# Patient Record
Sex: MALE | Race: OTHER | HISPANIC OR LATINO
[De-identification: names, ages, dates, MRNs, and addresses within clinical notes are randomized per-mention and may not be internally consistent; named-entity substitution may affect disease eponyms.]

---

## 2020-01-14 ENCOUNTER — TRANSCRIPTION ENCOUNTER (OUTPATIENT)
Age: 55
End: 2020-01-14

## 2020-03-28 ENCOUNTER — EMERGENCY (EMERGENCY)
Facility: HOSPITAL | Age: 55
LOS: 0 days | Discharge: HOME | End: 2020-03-29
Attending: EMERGENCY MEDICINE
Payer: SELF-PAY

## 2020-03-28 VITALS
WEIGHT: 149.91 LBS | OXYGEN SATURATION: 100 % | RESPIRATION RATE: 17 BRPM | DIASTOLIC BLOOD PRESSURE: 81 MMHG | TEMPERATURE: 99 F | HEIGHT: 66 IN | SYSTOLIC BLOOD PRESSURE: 142 MMHG | HEART RATE: 85 BPM

## 2020-03-28 DIAGNOSIS — R06.02 SHORTNESS OF BREATH: ICD-10-CM

## 2020-03-28 DIAGNOSIS — R05 COUGH: ICD-10-CM

## 2020-03-28 DIAGNOSIS — R07.89 OTHER CHEST PAIN: ICD-10-CM

## 2020-03-28 DIAGNOSIS — B34.9 VIRAL INFECTION, UNSPECIFIED: ICD-10-CM

## 2020-03-28 PROCEDURE — 71045 X-RAY EXAM CHEST 1 VIEW: CPT | Mod: 26

## 2020-03-28 PROCEDURE — 93005 ELECTROCARDIOGRAM TRACING: CPT

## 2020-03-28 PROCEDURE — 93010 ELECTROCARDIOGRAM REPORT: CPT

## 2020-03-28 PROCEDURE — 99053 MED SERV 10PM-8AM 24 HR FAC: CPT

## 2020-03-28 PROCEDURE — 99283 EMERGENCY DEPT VISIT LOW MDM: CPT | Mod: 25

## 2020-03-28 PROCEDURE — 71045 X-RAY EXAM CHEST 1 VIEW: CPT

## 2020-03-28 PROCEDURE — 87635 SARS-COV-2 COVID-19 AMP PRB: CPT

## 2020-03-28 PROCEDURE — 99283 EMERGENCY DEPT VISIT LOW MDM: CPT

## 2020-03-28 NOTE — ED PROVIDER NOTE - CLINICAL SUMMARY MEDICAL DECISION MAKING FREE TEXT BOX
viral syndrome, likely COVID, swab sent.  will do chest xray, pt without tachypnea, respiratory distress, clear lungs, Pox 100%

## 2020-03-28 NOTE — ED PROVIDER NOTE - PATIENT PORTAL LINK FT
You can access the FollowMyHealth Patient Portal offered by [facility] by registering at the following website: [email or web address]. By joining [employer]’s FollowMyHealth portal, you will also be able to view your health information using other applications (apps) compatible with our system.

## 2020-03-28 NOTE — ED ADULT NURSE NOTE - NSIMPLEMENTINTERV_GEN_ALL_ED
Implemented All Fall with Harm Risk Interventions:  [place] to call system. Call bell, personal items and telephone within reach. Instruct patient to call for assistance. Room bathroom lighting operational. Non-slip footwear when patient is off stretcher. Physically safe environment: no spills, clutter or unnecessary equipment. Stretcher in lowest position, wheels locked, appropriate side rails in place. Provide visual cue, wrist band, yellow gown, etc. Monitor gait and stability. Monitor for mental status changes and reorient to person, place, and time. Review medications for side effects contributing to fall risk. Reinforce activity limits and safety measures with patient and family. Provide visual clues: red socks.

## 2020-03-28 NOTE — ED ADULT NURSE NOTE - CHPI ED NUR SYMPTOMS NEG
no vomiting/no chills/no decreased eating/drinking/no weakness/no dizziness/no tingling/no fever/no nausea

## 2020-03-28 NOTE — ED PROVIDER NOTE - NSFOLLOWUPINSTRUCTIONS_ED_ALL_ED_FT
LMTCB -     Dr. René anthony to send in minivelle 0.1 mg 24 hour patch if patient is agreeable.  
Minivelle 0.1 mg patch sent into patient's pharmacy. Patient will let us know if she because symptomatic on the new patch.   
Your COVID-19 test is pending.  You will be notified of the results.  Self-Quarantine.    Have no contact with others that you might spread your infection to.   Stay home.    Take Tylenol 1 gram every 6 hours for fever.    Stay well hydrated  Return to ED for any concerns--any shortness of breath or difficulty breathing.

## 2020-03-28 NOTE — ED PROVIDER NOTE - OBJECTIVE STATEMENT
53yo male c/o cough, shortness of breath and chest pain with coughing.  Pt had fever last week with URI symptoms, no longer with fever.  Not taking any medications.  Wife also sick in the ED, had positive contact with COVID patient.  No n/v/d.  No abdominal pain.

## 2020-03-28 NOTE — ED PROVIDER NOTE - PROGRESS NOTE DETAILS
Patient likely with COVID 19 infection.  Chest Xray wnl.  Pox >95% without respiratory distress.  Will d/c home with return instructions for any worsening symptoms

## 2020-03-29 LAB — SARS-COV-2 RNA SPEC QL NAA+PROBE: SIGNIFICANT CHANGE UP

## 2020-03-29 NOTE — ED POST DISCHARGE NOTE - DETAILS
Left message for call back. signed Daja Naik PA-C Unable to reach patient. Will send certified letter via  callbacks. - Nahun Gamez PA-C

## 2020-04-07 RX ORDER — AZITHROMYCIN 500 MG/1
1 TABLET, FILM COATED ORAL
Qty: 6 | Refills: 0
Start: 2020-04-07 | End: 2020-04-12

## 2020-06-12 PROBLEM — Z78.9 OTHER SPECIFIED HEALTH STATUS: Chronic | Status: ACTIVE | Noted: 2020-03-28

## 2020-06-23 ENCOUNTER — APPOINTMENT (OUTPATIENT)
Dept: UROLOGY | Facility: CLINIC | Age: 55
End: 2020-06-23
Payer: MEDICAID

## 2020-06-23 VITALS
OXYGEN SATURATION: 98 % | HEIGHT: 73 IN | DIASTOLIC BLOOD PRESSURE: 77 MMHG | BODY MASS INDEX: 18.95 KG/M2 | WEIGHT: 143 LBS | SYSTOLIC BLOOD PRESSURE: 127 MMHG | HEART RATE: 69 BPM

## 2020-06-23 DIAGNOSIS — Z80.0 FAMILY HISTORY OF MALIGNANT NEOPLASM OF DIGESTIVE ORGANS: ICD-10-CM

## 2020-06-23 PROCEDURE — 99204 OFFICE O/P NEW MOD 45 MIN: CPT | Mod: 25

## 2020-06-23 PROCEDURE — 81003 URINALYSIS AUTO W/O SCOPE: CPT | Mod: QW

## 2020-06-24 PROBLEM — Z80.0 FAMILY HISTORY OF LIVER CANCER: Status: ACTIVE | Noted: 2020-06-24

## 2020-06-24 LAB
APPEARANCE: CLEAR
BACTERIA: NEGATIVE
BILIRUB UR QL STRIP: NORMAL
BILIRUBIN URINE: NEGATIVE
BLOOD URINE: NEGATIVE
COLOR: NORMAL
GLUCOSE QUALITATIVE U: NEGATIVE
GLUCOSE UR-MCNC: NORMAL
HCG UR QL: 0.2 EU/DL
HGB UR QL STRIP.AUTO: NORMAL
HYALINE CASTS: 0 /LPF
KETONES UR-MCNC: NORMAL
KETONES URINE: NEGATIVE
LEUKOCYTE ESTERASE UR QL STRIP: NORMAL
LEUKOCYTE ESTERASE URINE: NEGATIVE
MICROSCOPIC-UA: NORMAL
NITRITE UR QL STRIP: NORMAL
NITRITE URINE: NEGATIVE
PH UR STRIP: 7.5
PH URINE: 7.5
PROT UR STRIP-MCNC: NORMAL
PROTEIN URINE: NEGATIVE
RED BLOOD CELLS URINE: 0 /HPF
SP GR UR STRIP: 1.02
SPECIFIC GRAVITY URINE: 1.02
SQUAMOUS EPITHELIAL CELLS: 0 /HPF
UROBILINOGEN URINE: NORMAL
WHITE BLOOD CELLS URINE: 0 /HPF

## 2020-06-24 NOTE — REVIEW OF SYSTEMS
[Recent Weight Gain (___ Lbs)] : recent [unfilled] ~Ulb weight gain [Recent Weight Loss (___ Lbs)] : recent [unfilled] ~Ulb weight loss [Slow urine stream] : slow urine stream [Heartburn] : heartburn [Wake up at night to urinate  How many times?  ___] : wakes up to urinate [unfilled] times during the night [Dizziness] : dizziness [see HPI] : see HPI [Negative] : Neurological

## 2020-06-24 NOTE — PHYSICAL EXAM
[General Appearance - Well Developed] : well developed [General Appearance - Well Nourished] : well nourished [Normal Appearance] : normal appearance [Well Groomed] : well groomed [General Appearance - In No Acute Distress] : no acute distress [Edema] : no peripheral edema [Respiration, Rhythm And Depth] : normal respiratory rhythm and effort [Exaggerated Use Of Accessory Muscles For Inspiration] : no accessory muscle use [Abdomen Soft] : soft [Abdomen Tenderness] : non-tender [Costovertebral Angle Tenderness] : no ~M costovertebral angle tenderness [Urethral Meatus] : meatus normal [Urinary Bladder Findings] : the bladder was normal on palpation [Testes Mass (___cm)] : there were no testicular masses [Scrotum] : the scrotum was normal [No Prostate Nodules] : no prostate nodules [FreeTextEntry1] : Palpably benign small prostate gland [Normal Station and Gait] : the gait and station were normal for the patient's age [] : no rash [No Focal Deficits] : no focal deficits [Oriented To Time, Place, And Person] : oriented to person, place, and time [Affect] : the affect was normal [Mood] : the mood was normal [Not Anxious] : not anxious [No Palpable Adenopathy] : no palpable adenopathy

## 2020-06-24 NOTE — HISTORY OF PRESENT ILLNESS
[FreeTextEntry1] : This patient presents with complaints of erectile dysfunction noted gradually over the past year or so.  He denies difficulties urinating at this time.  He has not tried any remedies for erectile dysfunction to this time.

## 2020-06-24 NOTE — END OF VISIT
[FreeTextEntry3] : Labs are sent including a serum testosterone and he will have a duplex scan of the penis with plans to follow

## 2020-06-25 LAB — URINE CYTOLOGY: NORMAL

## 2021-12-13 ENCOUNTER — TRANSCRIPTION ENCOUNTER (OUTPATIENT)
Age: 56
End: 2021-12-13

## 2022-02-23 ENCOUNTER — APPOINTMENT (OUTPATIENT)
Dept: UROLOGY | Facility: CLINIC | Age: 57
End: 2022-02-23
Payer: MEDICAID

## 2022-02-23 VITALS
BODY MASS INDEX: 24.43 KG/M2 | SYSTOLIC BLOOD PRESSURE: 134 MMHG | OXYGEN SATURATION: 96 % | HEIGHT: 66 IN | HEART RATE: 92 BPM | DIASTOLIC BLOOD PRESSURE: 78 MMHG | WEIGHT: 152 LBS

## 2022-02-23 DIAGNOSIS — N52.9 MALE ERECTILE DYSFUNCTION, UNSPECIFIED: ICD-10-CM

## 2022-02-23 DIAGNOSIS — N40.0 BENIGN PROSTATIC HYPERPLASIA WITHOUT LOWER URINARY TRACT SYMPMS: ICD-10-CM

## 2022-02-23 PROCEDURE — 99214 OFFICE O/P EST MOD 30 MIN: CPT

## 2022-02-23 RX ORDER — SILDENAFIL 20 MG/1
20 TABLET ORAL
Qty: 90 | Refills: 11 | Status: ACTIVE | COMMUNITY
Start: 2022-02-23 | End: 1900-01-01

## 2022-02-23 NOTE — END OF VISIT
[FreeTextEntry3] : Labs were sent and he will have a duplex scan of the penis in 1 month.  I called in sildenafil for him with full instructions and precautions

## 2022-02-23 NOTE — HISTORY OF PRESENT ILLNESS
[FreeTextEntry1] : This patient has complaints of recent onset erectile dysfunction.  He has not had a PSA in some time either.

## 2022-02-23 NOTE — REVIEW OF SYSTEMS
[Recent Weight Gain (___ Lbs)] : recent [unfilled] ~Ulb weight gain [Recent Weight Loss (___ Lbs)] : recent [unfilled] ~Ulb weight loss [Heartburn] : heartburn [see HPI] : see HPI [Wake up at night to urinate  How many times?  ___] : wakes up to urinate [unfilled] times during the night [Slow urine stream] : slow urine stream [Dizziness] : dizziness [Negative] : Heme/Lymph

## 2022-02-23 NOTE — PHYSICAL EXAM
[General Appearance - Well Developed] : well developed [General Appearance - Well Nourished] : well nourished [Normal Appearance] : normal appearance [Well Groomed] : well groomed [General Appearance - In No Acute Distress] : no acute distress [Abdomen Soft] : soft [Abdomen Tenderness] : non-tender [Costovertebral Angle Tenderness] : no ~M costovertebral angle tenderness [Urethral Meatus] : meatus normal [Urinary Bladder Findings] : the bladder was normal on palpation [Scrotum] : the scrotum was normal [Testes Mass (___cm)] : there were no testicular masses [No Prostate Nodules] : no prostate nodules [FreeTextEntry1] : Palpably benign small prostate gland [Edema] : no peripheral edema [] : no respiratory distress [Respiration, Rhythm And Depth] : normal respiratory rhythm and effort [Exaggerated Use Of Accessory Muscles For Inspiration] : no accessory muscle use [Oriented To Time, Place, And Person] : oriented to person, place, and time [Affect] : the affect was normal [Mood] : the mood was normal [Not Anxious] : not anxious [Normal Station and Gait] : the gait and station were normal for the patient's age [No Focal Deficits] : no focal deficits [No Palpable Adenopathy] : no palpable adenopathy

## 2022-02-24 LAB
ALBUMIN SERPL ELPH-MCNC: 4.6 G/DL
ALP BLD-CCNC: 110 U/L
ALT SERPL-CCNC: 22 U/L
ANION GAP SERPL CALC-SCNC: 15 MMOL/L
APPEARANCE: CLEAR
AST SERPL-CCNC: 17 U/L
BACTERIA: NEGATIVE
BILIRUB SERPL-MCNC: 0.5 MG/DL
BILIRUBIN URINE: NEGATIVE
BLOOD URINE: NEGATIVE
BUN SERPL-MCNC: 23 MG/DL
CALCIUM SERPL-MCNC: 9 MG/DL
CHLORIDE SERPL-SCNC: 100 MMOL/L
CO2 SERPL-SCNC: 23 MMOL/L
COLOR: NORMAL
CREAT SERPL-MCNC: 1.36 MG/DL
GLUCOSE QUALITATIVE U: NEGATIVE
GLUCOSE SERPL-MCNC: 85 MG/DL
HYALINE CASTS: 0 /LPF
KETONES URINE: NEGATIVE
LEUKOCYTE ESTERASE URINE: NEGATIVE
MICROSCOPIC-UA: NORMAL
NITRITE URINE: NEGATIVE
PH URINE: 6.5
POTASSIUM SERPL-SCNC: 5.1 MMOL/L
PROT SERPL-MCNC: 7.3 G/DL
PROTEIN URINE: NEGATIVE
PSA SERPL-MCNC: 0.8 NG/ML
RED BLOOD CELLS URINE: 1 /HPF
SODIUM SERPL-SCNC: 138 MMOL/L
SPECIFIC GRAVITY URINE: 1.02
SQUAMOUS EPITHELIAL CELLS: 0 /HPF
TESTOST SERPL-MCNC: 402 NG/DL
UROBILINOGEN URINE: NORMAL
WHITE BLOOD CELLS URINE: 0 /HPF

## 2022-02-25 LAB — URINE CYTOLOGY: NORMAL

## 2022-02-27 ENCOUNTER — EMERGENCY (EMERGENCY)
Facility: HOSPITAL | Age: 57
LOS: 0 days | Discharge: HOME | End: 2022-02-28
Attending: EMERGENCY MEDICINE
Payer: MEDICAID

## 2022-02-27 VITALS
RESPIRATION RATE: 18 BRPM | DIASTOLIC BLOOD PRESSURE: 73 MMHG | SYSTOLIC BLOOD PRESSURE: 112 MMHG | HEART RATE: 92 BPM | TEMPERATURE: 98 F | OXYGEN SATURATION: 98 % | WEIGHT: 164.02 LBS | HEIGHT: 66 IN

## 2022-02-27 DIAGNOSIS — L50.9 URTICARIA, UNSPECIFIED: ICD-10-CM

## 2022-02-27 DIAGNOSIS — R21 RASH AND OTHER NONSPECIFIC SKIN ERUPTION: ICD-10-CM

## 2022-02-27 PROCEDURE — 99284 EMERGENCY DEPT VISIT MOD MDM: CPT

## 2022-02-27 PROCEDURE — 99283 EMERGENCY DEPT VISIT LOW MDM: CPT

## 2022-02-27 RX ORDER — FAMOTIDINE 10 MG/ML
1 INJECTION INTRAVENOUS
Qty: 4 | Refills: 0
Start: 2022-02-27 | End: 2022-03-02

## 2022-02-27 RX ORDER — DIPHENHYDRAMINE HCL 50 MG
1 CAPSULE ORAL
Qty: 20 | Refills: 0
Start: 2022-02-27

## 2022-02-27 RX ORDER — FAMOTIDINE 10 MG/ML
40 INJECTION INTRAVENOUS ONCE
Refills: 0 | Status: COMPLETED | OUTPATIENT
Start: 2022-02-27 | End: 2022-02-27

## 2022-02-27 RX ORDER — DIPHENHYDRAMINE HCL 50 MG
50 CAPSULE ORAL ONCE
Refills: 0 | Status: COMPLETED | OUTPATIENT
Start: 2022-02-27 | End: 2022-02-27

## 2022-02-27 RX ADMIN — Medication 50 MILLIGRAM(S): at 23:50

## 2022-02-27 RX ADMIN — FAMOTIDINE 20 MILLIGRAM(S): 10 INJECTION INTRAVENOUS at 23:48

## 2022-02-27 RX ADMIN — Medication 50 MILLIGRAM(S): at 23:49

## 2022-02-27 NOTE — ED STATDOCS - NSFOLLOWUPINSTRUCTIONS_ED_ALL_ED_FT
Urticaria    WHAT YOU NEED TO KNOW:    Urticaria is also called hives. Hives can change size and shape, and appear anywhere on your skin. They can be mild or severe and last from a few minutes to a few days. Hives may be a sign of a severe allergic reaction called anaphylaxis that needs immediate treatment. Urticaria that lasts longer than 6 weeks may be a chronic condition that needs long-term treatment.    Hives         DISCHARGE INSTRUCTIONS:    Call your local emergency number (911 in the ) for signs or symptoms of anaphylaxis, such as trouble breathing, swelling in your mouth or throat, or wheezing. You may also have itching, a rash, or feel like you are going to faint.    Return to the emergency department if:   •Your heart is beating faster than it normally does.      •You have cramping or severe pain in your abdomen.      Call your doctor if:   •You have a fever.      •Your skin still itches 24 hours after you take your medicine.      •You still have hives after 7 days.      •Your joints are painful and swollen.      •You have questions or concerns about your condition or care.      Medicines: You may need any of the following:  •Epinephrine is used to treat severe allergic reactions such as anaphylaxis.      •Antihistamines decrease mild symptoms such as itching or a rash.      •Steroids decrease redness, pain, and swelling.      •Take your medicine as directed. Contact your healthcare provider if you think your medicine is not helping or if you have side effects. Tell him of her if you are allergic to any medicine. Keep a list of the medicines, vitamins, and herbs you take. Include the amounts, and when and why you take them. Bring the list or the pill bottles to follow-up visits. Carry your medicine list with you in case of an emergency.      Steps to take for signs or symptoms of anaphylaxis:   •Immediately give 1 shot of epinephrine only into the outer thigh muscle.  How to Give An Epinephrine Shot Adult           •Leave the shot in place as directed. Your healthcare provider may recommend you leave it in place for up to 10 seconds before you remove it. This helps make sure all of the epinephrine is delivered.      •Call 911 and go to the emergency department, even if the shot improved symptoms. Do not drive yourself. Bring the used epinephrine shot with you.      Safety precautions to take if you are at risk for anaphylaxis:   •Keep 2 shots of epinephrine with you at all times. You may need a second shot, because epinephrine only works for about 20 minutes and symptoms may return. Your healthcare provider can show you and family members how to give the shot. Check the expiration date every month and replace it before it expires.      •Create an action plan. Your healthcare provider can help you create a written plan that explains the allergy and an emergency plan to treat a reaction. The plan explains when to give a second epinephrine shot if symptoms return or do not improve after the first. Give copies of the action plan and emergency instructions to family members, work and school staff, and  providers. Show them how to give a shot of epinephrine.      •Be careful when you exercise. If you have had exercise-induced anaphylaxis, do not exercise right after you eat. Stop exercising right away if you start to develop any signs or symptoms of anaphylaxis. You may first feel tired, warm, or have itchy skin. Hives, swelling, and severe breathing problems may develop if you continue to exercise.      •Carry medical alert identification. Wear medical alert jewelry or carry a card that explains the allergy. Ask your healthcare provider where to get these items.   Medical Alert Jewelry           •Keep a record of triggers and symptoms. Record everything you eat, drink, or apply to your skin for 3 weeks. Include stressful events and what you were doing right before your hives started. Bring the record with you to follow-up visits with your healthcare provider.      Manage urticaria:   •Cool your skin. This may help decrease itching. Apply a cool pack to your hives. Dip a hand towel in cool water, wring it out, and place it on your hives. You may also soak your skin in a cool oatmeal bath.      •Do not rub your hives. This can irritate your skin and cause more hives.      •Wear loose clothing. Tight clothes may irritate your skin and cause more hives.      •Manage stress. Stress may trigger hives, or make them worse. Learn new ways to relax, such as deep breathing.       Follow up with your healthcare provider as directed: Write down your questions so you remember to ask them during your visits.

## 2022-02-27 NOTE — ED STATDOCS - CLINICAL SUMMARY MEDICAL DECISION MAKING FREE TEXT BOX
Pt presents with hives, given prednisone, benadryl, pepcid, Pt presents with hives, given prednisone, benadryl, pepcid, I instructed him to discontinue the medication at this time since it maybe the cause (it was started a week ago) he is given f/u, return precautions and was dc in stable condition.

## 2022-02-27 NOTE — ED STATDOCS - PHYSICAL EXAMINATION
Constitutional: NAD AAOx3  Eyes: PERRLA EOMI  Head: Normocephalic atraumatic  Mouth: MMM, oropharynx is clear, normal tongue, no swelling   Cardiac: regular rate   Resp: Lungs CTAB  GI: Abd s/nt/nd  Neuro: CN2-12 intact  Extremities: Intact distal pulses b/l, no calf tenderness, normal ROM b/l UE and LE   Skin: + urticara present on back, abdomen, legs and arms

## 2022-02-27 NOTE — ED STATDOCS - NS ED ROS FT
Constitutional: No fever or chills  Eyes: No visual changes  HEENT: No throat pain  CV: No chest pain  Resp: No SOB no cough  GI: No abd pain, nausea or vomiting  : No dysuria  MSK: No musculoskeletal pain  Skin: +hives  Neuro: No headache

## 2022-02-27 NOTE — ED STATDOCS - OBJECTIVE STATEMENT
Pt is a 55 y/o M presenting with itchy rash that is present on his back, legs, abdomen and arms, stated he woke up with it today, denied any throat/tongue swelling, denied any CP/SOB/abd pain. Stated he had started a new medication last week, Gemfibrizol, otherwise denied any new soaps, detergents, new foods.

## 2022-02-27 NOTE — ED STATDOCS - PATIENT PORTAL LINK FT
You can access the FollowMyHealth Patient Portal offered by [facility] by registering at the following website: [email or web address]. By joining [employer]’s FollowMyHealth portal, you will also be able to view your health information using other applications (apps) compatible with our system.

## 2022-02-28 VITALS
HEART RATE: 76 BPM | SYSTOLIC BLOOD PRESSURE: 101 MMHG | TEMPERATURE: 98 F | OXYGEN SATURATION: 100 % | DIASTOLIC BLOOD PRESSURE: 62 MMHG | RESPIRATION RATE: 18 BRPM

## 2022-02-28 NOTE — ED ADULT NURSE NOTE - OBJECTIVE STATEMENT
Pt. reports to Ed for hives. Pt. reports hives on torso, and thighs, and urticaria.  Pt. denies SOB, tounge swelling, chest pain.

## 2022-02-28 NOTE — ED ADULT NURSE NOTE - NSIMPLEMENTINTERV_GEN_ALL_ED
Implemented All Universal Safety Interventions:  [place] to call system. Call bell, personal items and telephone within reach. Instruct patient to call for assistance. Room bathroom lighting operational. Non-slip footwear when patient is off stretcher. Physically safe environment: no spills, clutter or unnecessary equipment. Stretcher in lowest position, wheels locked, appropriate side rails in place.

## 2022-03-08 ENCOUNTER — APPOINTMENT (OUTPATIENT)
Dept: ALLERGY | Facility: CLINIC | Age: 57
End: 2022-03-08

## 2022-03-29 ENCOUNTER — APPOINTMENT (OUTPATIENT)
Dept: UROLOGY | Facility: CLINIC | Age: 57
End: 2022-03-29

## 2022-04-06 ENCOUNTER — EMERGENCY (EMERGENCY)
Facility: HOSPITAL | Age: 57
LOS: 0 days | Discharge: HOME | End: 2022-04-06
Attending: EMERGENCY MEDICINE
Payer: MEDICAID

## 2022-04-06 VITALS
TEMPERATURE: 98 F | DIASTOLIC BLOOD PRESSURE: 82 MMHG | WEIGHT: 154.1 LBS | HEIGHT: 66 IN | OXYGEN SATURATION: 99 % | RESPIRATION RATE: 18 BRPM | SYSTOLIC BLOOD PRESSURE: 125 MMHG | HEART RATE: 91 BPM

## 2022-04-06 VITALS
HEART RATE: 62 BPM | RESPIRATION RATE: 18 BRPM | OXYGEN SATURATION: 100 % | DIASTOLIC BLOOD PRESSURE: 81 MMHG | TEMPERATURE: 98 F | SYSTOLIC BLOOD PRESSURE: 126 MMHG

## 2022-04-06 DIAGNOSIS — Z80.0 FAMILY HISTORY OF MALIGNANT NEOPLASM OF DIGESTIVE ORGANS: ICD-10-CM

## 2022-04-06 DIAGNOSIS — F41.9 ANXIETY DISORDER, UNSPECIFIED: ICD-10-CM

## 2022-04-06 DIAGNOSIS — M54.2 CERVICALGIA: ICD-10-CM

## 2022-04-06 DIAGNOSIS — R22.1 LOCALIZED SWELLING, MASS AND LUMP, NECK: ICD-10-CM

## 2022-04-06 LAB
ALBUMIN SERPL ELPH-MCNC: 3.6 G/DL — SIGNIFICANT CHANGE UP (ref 3.3–5)
ALP SERPL-CCNC: 90 U/L — SIGNIFICANT CHANGE UP (ref 40–120)
ALT FLD-CCNC: 38 U/L — SIGNIFICANT CHANGE UP (ref 12–78)
ANION GAP SERPL CALC-SCNC: 4 MMOL/L — LOW (ref 5–17)
AST SERPL-CCNC: 27 U/L — SIGNIFICANT CHANGE UP (ref 15–37)
BASOPHILS # BLD AUTO: 0.07 K/UL — SIGNIFICANT CHANGE UP (ref 0–0.2)
BASOPHILS NFR BLD AUTO: 1.1 % — SIGNIFICANT CHANGE UP (ref 0–2)
BILIRUB SERPL-MCNC: 0.4 MG/DL — SIGNIFICANT CHANGE UP (ref 0.2–1.2)
BUN SERPL-MCNC: 28 MG/DL — HIGH (ref 7–23)
CALCIUM SERPL-MCNC: 8.5 MG/DL — SIGNIFICANT CHANGE UP (ref 8.5–10.1)
CHLORIDE SERPL-SCNC: 106 MMOL/L — SIGNIFICANT CHANGE UP (ref 96–108)
CO2 SERPL-SCNC: 28 MMOL/L — SIGNIFICANT CHANGE UP (ref 22–31)
CREAT SERPL-MCNC: 1.01 MG/DL — SIGNIFICANT CHANGE UP (ref 0.5–1.3)
EGFR: 88 ML/MIN/1.73M2 — SIGNIFICANT CHANGE UP
EOSINOPHIL # BLD AUTO: 0.14 K/UL — SIGNIFICANT CHANGE UP (ref 0–0.5)
EOSINOPHIL NFR BLD AUTO: 2.3 % — SIGNIFICANT CHANGE UP (ref 0–6)
GLUCOSE SERPL-MCNC: 108 MG/DL — HIGH (ref 70–99)
HCT VFR BLD CALC: 41.3 % — SIGNIFICANT CHANGE UP (ref 39–50)
HGB BLD-MCNC: 14.3 G/DL — SIGNIFICANT CHANGE UP (ref 13–17)
IMM GRANULOCYTES NFR BLD AUTO: 0.2 % — SIGNIFICANT CHANGE UP (ref 0–1.5)
LACTATE SERPL-SCNC: 0.1 MMOL/L — LOW (ref 0.7–2)
LYMPHOCYTES # BLD AUTO: 2.28 K/UL — SIGNIFICANT CHANGE UP (ref 1–3.3)
LYMPHOCYTES # BLD AUTO: 37.1 % — SIGNIFICANT CHANGE UP (ref 13–44)
MCHC RBC-ENTMCNC: 32.4 PG — SIGNIFICANT CHANGE UP (ref 27–34)
MCHC RBC-ENTMCNC: 34.6 GM/DL — SIGNIFICANT CHANGE UP (ref 32–36)
MCV RBC AUTO: 93.7 FL — SIGNIFICANT CHANGE UP (ref 80–100)
MONOCYTES # BLD AUTO: 0.53 K/UL — SIGNIFICANT CHANGE UP (ref 0–0.9)
MONOCYTES NFR BLD AUTO: 8.6 % — SIGNIFICANT CHANGE UP (ref 2–14)
NEUTROPHILS # BLD AUTO: 3.11 K/UL — SIGNIFICANT CHANGE UP (ref 1.8–7.4)
NEUTROPHILS NFR BLD AUTO: 50.7 % — SIGNIFICANT CHANGE UP (ref 43–77)
PLATELET # BLD AUTO: 219 K/UL — SIGNIFICANT CHANGE UP (ref 150–400)
POTASSIUM SERPL-MCNC: 3.7 MMOL/L — SIGNIFICANT CHANGE UP (ref 3.5–5.3)
POTASSIUM SERPL-SCNC: 3.7 MMOL/L — SIGNIFICANT CHANGE UP (ref 3.5–5.3)
PROT SERPL-MCNC: 7.3 GM/DL — SIGNIFICANT CHANGE UP (ref 6–8.3)
RBC # BLD: 4.41 M/UL — SIGNIFICANT CHANGE UP (ref 4.2–5.8)
RBC # FLD: 12.7 % — SIGNIFICANT CHANGE UP (ref 10.3–14.5)
SODIUM SERPL-SCNC: 138 MMOL/L — SIGNIFICANT CHANGE UP (ref 135–145)
WBC # BLD: 6.14 K/UL — SIGNIFICANT CHANGE UP (ref 3.8–10.5)
WBC # FLD AUTO: 6.14 K/UL — SIGNIFICANT CHANGE UP (ref 3.8–10.5)

## 2022-04-06 PROCEDURE — G1004: CPT

## 2022-04-06 PROCEDURE — 70491 CT SOFT TISSUE NECK W/DYE: CPT | Mod: MG

## 2022-04-06 PROCEDURE — 36415 COLL VENOUS BLD VENIPUNCTURE: CPT

## 2022-04-06 PROCEDURE — 70491 CT SOFT TISSUE NECK W/DYE: CPT | Mod: 26,MG

## 2022-04-06 PROCEDURE — 99284 EMERGENCY DEPT VISIT MOD MDM: CPT | Mod: 25

## 2022-04-06 PROCEDURE — 70450 CT HEAD/BRAIN W/O DYE: CPT | Mod: MF

## 2022-04-06 PROCEDURE — 70450 CT HEAD/BRAIN W/O DYE: CPT | Mod: 26,MF

## 2022-04-06 PROCEDURE — 99285 EMERGENCY DEPT VISIT HI MDM: CPT

## 2022-04-06 PROCEDURE — 83605 ASSAY OF LACTIC ACID: CPT

## 2022-04-06 PROCEDURE — 85025 COMPLETE CBC W/AUTO DIFF WBC: CPT

## 2022-04-06 PROCEDURE — 80053 COMPREHEN METABOLIC PANEL: CPT

## 2022-04-06 RX ORDER — SODIUM CHLORIDE 9 MG/ML
1000 INJECTION INTRAMUSCULAR; INTRAVENOUS; SUBCUTANEOUS ONCE
Refills: 0 | Status: COMPLETED | OUTPATIENT
Start: 2022-04-06 | End: 2022-04-06

## 2022-04-06 RX ADMIN — SODIUM CHLORIDE 1000 MILLILITER(S): 9 INJECTION INTRAMUSCULAR; INTRAVENOUS; SUBCUTANEOUS at 20:10

## 2022-04-06 NOTE — ED STATDOCS - CARE PLAN
Principal Discharge DX:	Neck pain  Goal:	No nuchal rigidity, only here because he felt there was a bulge on the left neck, lymphadenopathy ?, CT shows no acute pathology, family hx of gastric adenocarcinoma, to follow up with PMD, GI, no complaints dc   1

## 2022-04-06 NOTE — ED STATDOCS - ENMT, MLM
Nasal mucosa clear.  Mouth with normal mucosa  +Palpable irregular on the L side of the neck on sternocleidomastoid muscle, Throat has no vesicles, no oropharyngeal exudates and uvula is midline.

## 2022-04-06 NOTE — ED STATDOCS - PLAN OF CARE
No nuchal rigidity, only here because he felt there was a bulge on the left neck, lymphadenopathy ?, CT shows no acute pathology, family hx of gastric adenocarcinoma, to follow up with PMD, GI, no complaints dc

## 2022-04-06 NOTE — ED STATDOCS - PATIENT PORTAL LINK FT
You can access the FollowMyHealth Patient Portal offered by [facility] by registering at the following website: [email or web address]. By joining [employer]’s FollowMyHealth portal, you will also be able to view your health information using other applications (apps) compatible with our system.

## 2022-04-06 NOTE — ED STATDOCS - PROGRESS NOTE DETAILS
Naye SOLOMON: Nontoxic appearing. AAOx 4. Ambulatory. no visual or focal neurological complaints. Discussed the results of the CT with patient and provided him with hard copies. Patient is understandably anxious about possibility of CA and tumors as cause of his painful gland? vs. sensation of swelling. Instructed him to see GI, to consider endo, and MR of the neck and potential further other outpatient testing out of the scope of the ED. Patient AAOx4, fully understanding, will follow up with GI, strict return precautions, happy with care provided in the ED prior to dc.

## 2022-04-06 NOTE — ED STATDOCS - NSFOLLOWUPINSTRUCTIONS_ED_ALL_ED_FT
Please note that we have discussed the result of your CT scan and labs with you. Pleas note that you need to see a gastroenterologist and your primary care doctor as soon as possible, se you might need further imaging such as endoscopy, colonoscopy or MRI. IF you symptoms get worse return to us. Otherwise please note that a CT is the most appropriate emergent imaging, does not cover certain pathologies such as tumors, etc. Please note that we have discussed the result of your CT scan and labs with you. Pleas note that you need to see a gastroenterologist and your primary care doctor as soon as possible, se you might need further imaging such as endoscopy, colonoscopy or MRI. IF you symptoms get worse return to us. Otherwise please note that a CT is the most appropriate emergent imaging, does not cover certain pathologies such as tumors, etc. Please return to us if you have any fever, chills, abdominal pain, blood in stool or urine or if any other health concerns. Please return to us if any other health concerns. Please note that I have discussed all the results with you and provided you with outpatient follow up with a gastroenterologist and you are to see your primary care physician as well as soon as possible.    ________________

## 2022-04-06 NOTE — ED STATDOCS - CARE PROVIDER_API CALL
Gabby Flores  GASTROENTEROLOGY  [address]  [place] [address]  Phone: [phone]  Fax: [phone]  Follow Up Time:

## 2022-04-06 NOTE — ED ADULT NURSE NOTE - OBJECTIVE STATEMENT
pt c/o left side neck pain/nodule started this am. "I found a lump that was painful and sticking out on my left side of neck".

## 2022-04-06 NOTE — ED STATDOCS - NS_ ATTENDINGSCRIBEDETAILS _ED_A_ED_FT
I Taj Yancey MD saw and examined the patient. Scribe documented for me and under my supervision. I have modified the scribe's documentation where necessary to reflect my history, physical exam and other relevant documentations pertinent to the care of the patient.

## 2022-04-06 NOTE — ED STATDOCS - MUSCULOSKELETAL, MLM
range of motion is not limited and there is no muscle tenderness. range of motion is not limited and there is no muscle tenderness. 5/5 strength on flexion and extension of all limbs. No nuchal rigidity. no saddle anesthesia.

## 2022-04-06 NOTE — ED STATDOCS - OBJECTIVE STATEMENT
55 y/o male presents to the ED with L sided neck swelling. Pt reports his family was concerned and came to the ED for eval. No chest pain, no abdominal pain. Pt endorses family hx of stomach cancer. Pt with no other complaints at this time. 57 y/o male presents to the ED with L sided neck sensation of swelling. Pt reports his family was concerned and came to the ED for eval. No chest pain, no abdominal pain. No neck stiffness. No melena or hematochezia. No dysuria hematuria or frequency. No abdominal pain. No cp, sob or palpitation. No phlegm expectoration, no exertional dyspnea, no orthopnea, no known hx of tumors or cancers, no recent surgery on neck or chest, has family hx of gastric CA, and so was discussing the sensation of painful lymph or sensation of swelling and came to the ED to be evaluated. Patient states in the planning to have MR outpatient but as of now no scheduled imaging.  Pt endorses family hx of stomach cancer. Pt with no other complaints at this time.

## 2022-04-13 ENCOUNTER — APPOINTMENT (OUTPATIENT)
Dept: GASTROENTEROLOGY | Facility: CLINIC | Age: 57
End: 2022-04-13
Payer: MEDICAID

## 2022-04-13 VITALS
HEART RATE: 87 BPM | SYSTOLIC BLOOD PRESSURE: 129 MMHG | BODY MASS INDEX: 24.75 KG/M2 | WEIGHT: 154 LBS | HEIGHT: 66 IN | DIASTOLIC BLOOD PRESSURE: 80 MMHG

## 2022-04-13 DIAGNOSIS — K21.9 GASTRO-ESOPHAGEAL REFLUX DISEASE W/OUT ESOPHAGITIS: ICD-10-CM

## 2022-04-13 PROCEDURE — 99203 OFFICE O/P NEW LOW 30 MIN: CPT

## 2022-04-13 NOTE — HISTORY OF PRESENT ILLNESS
[FreeTextEntry1] : Recently the patient went to the emergency room when he saw his muscle in his neck thinking that it was a mass. He was discharged for followup to a gastroenterologist. He has long-standing difficulty with indigestion and burping. No constipation no diarrhea no rectal

## 2022-04-13 NOTE — PHYSICAL EXAM
[General Appearance - Alert] : alert [General Appearance - In No Acute Distress] : in no acute distress [PERRL With Normal Accommodation] : pupils were equal in size, round, and reactive to light [Sclera] : the sclera and conjunctiva were normal [Extraocular Movements] : extraocular movements were intact [Outer Ear] : the ears and nose were normal in appearance [Oropharynx] : the oropharynx was normal [Neck Appearance] : the appearance of the neck was normal [Neck Cervical Mass (___cm)] : no neck mass was observed [Jugular Venous Distention Increased] : there was no jugular-venous distention [Thyroid Diffuse Enlargement] : the thyroid was not enlarged [Thyroid Nodule] : there were no palpable thyroid nodules [Auscultation Breath Sounds / Voice Sounds] : lungs were clear to auscultation bilaterally [Heart Rate And Rhythm] : heart rate was normal and rhythm regular [Heart Sounds] : normal S1 and S2 [Heart Sounds Gallop] : no gallops [Murmurs] : no murmurs [Heart Sounds Pericardial Friction Rub] : no pericardial rub [Bowel Sounds] : normal bowel sounds [Abdomen Tenderness] : non-tender [Abdomen Soft] : soft [] : no hepato-splenomegaly [Abdomen Mass (___ Cm)] : no abdominal mass palpated

## 2022-07-18 ENCOUNTER — APPOINTMENT (OUTPATIENT)
Dept: GASTROENTEROLOGY | Facility: AMBULATORY MEDICAL SERVICES | Age: 57
End: 2022-07-18

## 2022-11-15 ENCOUNTER — APPOINTMENT (OUTPATIENT)
Dept: GASTROENTEROLOGY | Facility: AMBULATORY MEDICAL SERVICES | Age: 57
End: 2022-11-15

## 2023-01-16 NOTE — ED STATDOCS - NEURO NEGATIVE STATEMENT, MLM
[address] [facility] @ [facility] VISIT NOTE     0845: Patient arrives for Galectin Clinical Trial without acute problems. Please see [facility] for complete assessment and education provided. Vital signs stable throughout and prior to discharge, Pt. Tolerated treatment well and discharged without incident. Patient is aware of next [place] appointment on 01/30/2023. Appointment card given to patient.     Medications  Medications Administered       0.9% sodium chloride infusion       Admin Date  01/16/2023 Action  New Bag Dose  25 mL/hr Rate  25 mL/hr Route  IntraVENous Administered By  Jammie Moss RN              INV [id number] galactoarabino-rhamnogalacturonate/placebo in 0.9% sodium chloride 100 mL (+/- 10% base fluid volume) investigational infusion       Admin Date  01/16/2023 Action  New Bag Dose   Rate  100 mL/hr Route  IntraVENous Administered By  Jammie Moss RN                      VITAL SIGNS   Patient Vitals for the past 12 hrs:   Temp Pulse Resp BP SpO2   01/16/23 1058 -- 74 16 (!) 124/91 --   01/16/23 0848 97.5 °F (36.4 °C) 79 18 (!) 147/80 98 % no loss of consciousness, no gait abnormality, no headache, no sensory deficits, and no weakness.

## 2023-05-08 NOTE — ED PROVIDER NOTE - CPE EDP EYES NORM
normal... Notification Instructions: Patient will be notified of biopsy results. However, patient instructed to call the office if not contacted within 2 weeks.

## 2024-02-13 ENCOUNTER — APPOINTMENT (OUTPATIENT)
Dept: HUMAN REPRODUCTION | Facility: CLINIC | Age: 59
End: 2024-02-13

## 2024-03-18 ENCOUNTER — APPOINTMENT (OUTPATIENT)
Dept: HUMAN REPRODUCTION | Facility: CLINIC | Age: 59
End: 2024-03-18
Payer: SELF-PAY

## 2024-03-18 PROCEDURE — 89322 SEMEN ANAL STRICT CRITERIA: CPT

## 2024-04-15 ENCOUNTER — APPOINTMENT (OUTPATIENT)
Dept: UROLOGY | Facility: CLINIC | Age: 59
End: 2024-04-15

## 2024-04-26 ENCOUNTER — APPOINTMENT (OUTPATIENT)
Dept: UROLOGY | Facility: CLINIC | Age: 59
End: 2024-04-26
Payer: MEDICAID

## 2024-04-26 VITALS
RESPIRATION RATE: 14 BRPM | OXYGEN SATURATION: 98 % | WEIGHT: 160 LBS | SYSTOLIC BLOOD PRESSURE: 130 MMHG | HEIGHT: 67 IN | HEART RATE: 77 BPM | TEMPERATURE: 97.9 F | BODY MASS INDEX: 25.11 KG/M2 | DIASTOLIC BLOOD PRESSURE: 85 MMHG

## 2024-04-26 DIAGNOSIS — Z12.5 ENCOUNTER FOR SCREENING FOR MALIGNANT NEOPLASM OF PROSTATE: ICD-10-CM

## 2024-04-26 DIAGNOSIS — R30.0 DYSURIA: ICD-10-CM

## 2024-04-26 PROCEDURE — 99213 OFFICE O/P EST LOW 20 MIN: CPT

## 2024-04-26 RX ORDER — DOXYCYCLINE 100 MG/1
100 TABLET, FILM COATED ORAL
Qty: 20 | Refills: 0 | Status: ACTIVE | COMMUNITY
Start: 2024-04-26 | End: 1900-01-01

## 2024-04-27 LAB
APPEARANCE: CLEAR
BACTERIA: NEGATIVE /HPF
BILIRUBIN URINE: NEGATIVE
BLOOD URINE: NEGATIVE
CAST: 0 /LPF
COLOR: YELLOW
EPITHELIAL CELLS: 0 /HPF
GLUCOSE QUALITATIVE U: NEGATIVE MG/DL
KETONES URINE: NEGATIVE MG/DL
LEUKOCYTE ESTERASE URINE: NEGATIVE
MICROSCOPIC-UA: NORMAL
NITRITE URINE: NEGATIVE
PH URINE: 7.5
PROTEIN URINE: NEGATIVE MG/DL
PSA SERPL-MCNC: 0.62 NG/ML
RED BLOOD CELLS URINE: 0 /HPF
SPECIFIC GRAVITY URINE: 1.02
UROBILINOGEN URINE: 1 MG/DL
WHITE BLOOD CELLS URINE: 0 /HPF

## 2024-04-29 LAB — BACTERIA UR CULT: NORMAL

## 2024-04-29 NOTE — ASSESSMENT
[FreeTextEntry1] : 58-year-old male with BPH, ED, and Dysuria.   For Dysuria, will send UA, UC, and Cytology.  Will Rx Doxycycline 100mg BID for possible acute prostatitis versus UTI.   For BPH, no bothersome LUTS. Will observe.   For ED, will refill Sildenafil as needed.   For PSA Screening, will send PSA.   Patient will RTO in 1 year or sooner if needed.

## 2024-04-29 NOTE — PHYSICAL EXAM
[Normal Appearance] : normal appearance [Well Groomed] : well groomed [General Appearance - In No Acute Distress] : no acute distress [Edema] : no peripheral edema [Respiration, Rhythm And Depth] : normal respiratory rhythm and effort [Exaggerated Use Of Accessory Muscles For Inspiration] : no accessory muscle use [Abdomen Soft] : soft [Abdomen Tenderness] : non-tender [Costovertebral Angle Tenderness] : no ~M costovertebral angle tenderness [Urethral Meatus] : meatus normal [Urinary Bladder Findings] : the bladder was normal on palpation [Normal Station and Gait] : the gait and station were normal for the patient's age [] : no rash [No Focal Deficits] : no focal deficits [Oriented To Time, Place, And Person] : oriented to person, place, and time [Affect] : the affect was normal [Mood] : the mood was normal [No Palpable Adenopathy] : no palpable adenopathy [de-identified] : MALCOLM Refused. Linear superficial implant of dorsal penis that patient states he had surgically placed.

## 2024-04-29 NOTE — HISTORY OF PRESENT ILLNESS
[FreeTextEntry1] : 58-year-old male presents for follow up with complaints of dysuria. Denies straining, hesitancy, urgency, or incontinence of urine. State his Girlfriend was recently diagnosed with HPV and he has concerns for infection. Reports STD panel was negative. Denies discharge or penile lesions. Does not need refill of Sildenafil.

## 2024-04-30 LAB — URINE CYTOLOGY: NORMAL

## 2025-01-17 ENCOUNTER — EMERGENCY (EMERGENCY)
Facility: HOSPITAL | Age: 60
LOS: 0 days | Discharge: HOME | End: 2025-01-17
Attending: EMERGENCY MEDICINE
Payer: MEDICAID

## 2025-01-17 VITALS
HEART RATE: 75 BPM | OXYGEN SATURATION: 100 % | TEMPERATURE: 98 F | DIASTOLIC BLOOD PRESSURE: 68 MMHG | WEIGHT: 160.72 LBS | RESPIRATION RATE: 17 BRPM | SYSTOLIC BLOOD PRESSURE: 115 MMHG

## 2025-01-17 DIAGNOSIS — M54.2 CERVICALGIA: ICD-10-CM

## 2025-01-17 DIAGNOSIS — R07.9 CHEST PAIN, UNSPECIFIED: ICD-10-CM

## 2025-01-17 DIAGNOSIS — M79.602 PAIN IN LEFT ARM: ICD-10-CM

## 2025-01-17 DIAGNOSIS — R07.89 OTHER CHEST PAIN: ICD-10-CM

## 2025-01-17 LAB
ALBUMIN SERPL ELPH-MCNC: 3.8 G/DL — SIGNIFICANT CHANGE UP (ref 3.3–5)
ALP SERPL-CCNC: 91 U/L — SIGNIFICANT CHANGE UP (ref 40–120)
ALT FLD-CCNC: 28 U/L — SIGNIFICANT CHANGE UP (ref 12–78)
ANION GAP SERPL CALC-SCNC: 4 MMOL/L — LOW (ref 5–17)
AST SERPL-CCNC: 22 U/L — SIGNIFICANT CHANGE UP (ref 15–37)
BASOPHILS # BLD AUTO: 0.06 K/UL — SIGNIFICANT CHANGE UP (ref 0–0.2)
BASOPHILS NFR BLD AUTO: 1.3 % — SIGNIFICANT CHANGE UP (ref 0–2)
BILIRUB SERPL-MCNC: 0.3 MG/DL — SIGNIFICANT CHANGE UP (ref 0.2–1.2)
BUN SERPL-MCNC: 21 MG/DL — SIGNIFICANT CHANGE UP (ref 7–23)
CALCIUM SERPL-MCNC: 8.9 MG/DL — SIGNIFICANT CHANGE UP (ref 8.5–10.1)
CHLORIDE SERPL-SCNC: 107 MMOL/L — SIGNIFICANT CHANGE UP (ref 96–108)
CO2 SERPL-SCNC: 27 MMOL/L — SIGNIFICANT CHANGE UP (ref 22–31)
CREAT SERPL-MCNC: 0.89 MG/DL — SIGNIFICANT CHANGE UP (ref 0.5–1.3)
EGFR: 99 ML/MIN/1.73M2 — SIGNIFICANT CHANGE UP
EOSINOPHIL # BLD AUTO: 0.23 K/UL — SIGNIFICANT CHANGE UP (ref 0–0.5)
EOSINOPHIL NFR BLD AUTO: 5 % — SIGNIFICANT CHANGE UP (ref 0–6)
GLUCOSE SERPL-MCNC: 111 MG/DL — HIGH (ref 70–99)
HCT VFR BLD CALC: 42.4 % — SIGNIFICANT CHANGE UP (ref 39–50)
HGB BLD-MCNC: 14.4 G/DL — SIGNIFICANT CHANGE UP (ref 13–17)
IMM GRANULOCYTES NFR BLD AUTO: 0.2 % — SIGNIFICANT CHANGE UP (ref 0–0.9)
LYMPHOCYTES # BLD AUTO: 2.13 K/UL — SIGNIFICANT CHANGE UP (ref 1–3.3)
LYMPHOCYTES # BLD AUTO: 46.7 % — HIGH (ref 13–44)
MCHC RBC-ENTMCNC: 32 PG — SIGNIFICANT CHANGE UP (ref 27–34)
MCHC RBC-ENTMCNC: 34 G/DL — SIGNIFICANT CHANGE UP (ref 32–36)
MCV RBC AUTO: 94.2 FL — SIGNIFICANT CHANGE UP (ref 80–100)
MONOCYTES # BLD AUTO: 0.46 K/UL — SIGNIFICANT CHANGE UP (ref 0–0.9)
MONOCYTES NFR BLD AUTO: 10.1 % — SIGNIFICANT CHANGE UP (ref 2–14)
NEUTROPHILS # BLD AUTO: 1.67 K/UL — LOW (ref 1.8–7.4)
NEUTROPHILS NFR BLD AUTO: 36.7 % — LOW (ref 43–77)
PLATELET # BLD AUTO: 244 K/UL — SIGNIFICANT CHANGE UP (ref 150–400)
POTASSIUM SERPL-MCNC: 3.7 MMOL/L — SIGNIFICANT CHANGE UP (ref 3.5–5.3)
POTASSIUM SERPL-SCNC: 3.7 MMOL/L — SIGNIFICANT CHANGE UP (ref 3.5–5.3)
PROT SERPL-MCNC: 7.4 GM/DL — SIGNIFICANT CHANGE UP (ref 6–8.3)
RBC # BLD: 4.5 M/UL — SIGNIFICANT CHANGE UP (ref 4.2–5.8)
RBC # FLD: 12.4 % — SIGNIFICANT CHANGE UP (ref 10.3–14.5)
SODIUM SERPL-SCNC: 138 MMOL/L — SIGNIFICANT CHANGE UP (ref 135–145)
TROPONIN I, HIGH SENSITIVITY RESULT: 26.8 NG/L — SIGNIFICANT CHANGE UP
WBC # BLD: 4.56 K/UL — SIGNIFICANT CHANGE UP (ref 3.8–10.5)
WBC # FLD AUTO: 4.56 K/UL — SIGNIFICANT CHANGE UP (ref 3.8–10.5)

## 2025-01-17 PROCEDURE — 80053 COMPREHEN METABOLIC PANEL: CPT

## 2025-01-17 PROCEDURE — 84484 ASSAY OF TROPONIN QUANT: CPT

## 2025-01-17 PROCEDURE — 71046 X-RAY EXAM CHEST 2 VIEWS: CPT | Mod: 26

## 2025-01-17 PROCEDURE — 93010 ELECTROCARDIOGRAM REPORT: CPT

## 2025-01-17 PROCEDURE — 99285 EMERGENCY DEPT VISIT HI MDM: CPT | Mod: 25

## 2025-01-17 PROCEDURE — 71046 X-RAY EXAM CHEST 2 VIEWS: CPT

## 2025-01-17 PROCEDURE — 85025 COMPLETE CBC W/AUTO DIFF WBC: CPT

## 2025-01-17 PROCEDURE — 36415 COLL VENOUS BLD VENIPUNCTURE: CPT

## 2025-01-17 PROCEDURE — 93005 ELECTROCARDIOGRAM TRACING: CPT

## 2025-01-17 PROCEDURE — 99285 EMERGENCY DEPT VISIT HI MDM: CPT

## 2025-01-17 RX ORDER — IBUPROFEN 200 MG
1 TABLET ORAL
Qty: 15 | Refills: 0
Start: 2025-01-17 | End: 2025-01-21

## 2025-01-17 RX ORDER — CYCLOBENZAPRINE HCL 10 MG
1 TABLET ORAL
Qty: 10 | Refills: 0
Start: 2025-01-17

## 2025-01-17 RX ORDER — IBUPROFEN 200 MG
600 TABLET ORAL ONCE
Refills: 0 | Status: COMPLETED | OUTPATIENT
Start: 2025-01-17 | End: 2025-01-17

## 2025-01-17 RX ADMIN — Medication 600 MILLIGRAM(S): at 21:51

## 2025-01-17 NOTE — ED ADULT TRIAGE NOTE - NS ED NURSE BANDS TYPE
SW following for DC planning.  Updates sent to [facility] and [facility] both in [place].  These are the only two facilities that have accepted pt at this time.  Son still needs to select one of the ECF's.  Per MD notes pt back to OR tomorrow.  Will follow.    Sendy Alarcon MSW, LSW  [id number]   Name band;

## 2025-01-17 NOTE — ED STATDOCS - OBJECTIVE STATEMENT
59 year old male with no pertinent PMHx; presents to ED c/o chest pain radiating into LUE since 12p today. Patient also reports neck pain and upper back pain that is exacerbated with movement. Denies SOB, difficulty breathing. No significant cardiac history.

## 2025-01-17 NOTE — ED STATDOCS - INTERNATIONAL TRAVEL
Daily Note     Today's date: 2023  Patient name: Leann Ortega  : 1969  MRN: [id number]  Referring provider: Reyna Anna MD  Dx:   Encounter Diagnosis     ICD-10-CM    1. Acute left-sided low back pain without sciatica  M54.50                      Subjective: pt reports his back was a little sore yesterday. Pt reports he is getting shots in his back tomorrow. Objective: See treatment diary below      Assessment: Tolerated treatment well. Patient would benefit from continued PT. Continued w/ current POC as listed below w/ good tolerance. Progressed functional carrying w/ no c/o increased sxs. Plan: Continue per plan of care.       Diagnosis: lbp   Precautions: IM mary in L leg   POC Expires: 23   Re-evaluation Date: 23   FOTO Scores/Date: 71; 10/12-54 11/3-54   Visit Count 11/1 1/10 2/10 3/10 4/10   Manuals  11/3 11/8 11/10 11/13   L LAD                        Ther Ex         LTR 10" 10x  10" 10x  10" 10x 10" 10x   Quad stretch with strap 30" 3x  30" 3x  30" 3x 30" 3x   Bike  6' 6'  6'  6' 6'   SLR  2x10  2x10 2x10 2x10   S/l abd         Side stepping Btb 4 laps  Btb 4 laps  Btb 4 laps  Btb 4 laps    Piriformis stretch  30" 3x       Hip 3 way Btb 20x ea  Btb 20x ea  Btb 20x ea  Btb 20x ea    Pt edu  Updated FOTO/ measurements/ pt edu/ updated HEP       Neuro Re-Ed        Bridges Btb 5" 2x10  BTB 2x10 5"  BTB 2x10 5"  BTB 2x10 5"    clamshells Btb 5" 2x10  BTB 2x10x5"  BTB 2x10 5"  BTB 2x10 5"    TA activation T/o session       TA marches          Paloff press Double grn 20x  Double grn 20x   Double grn 20x              Ther Act        STS        Step ups         Suitcase Carry w/ TA     5# KB 3 laps ea                                     Modalities No

## 2025-01-17 NOTE — ED STATDOCS - NSFOLLOWUPINSTRUCTIONS_ED_ALL_ED_FT
FOLLOW UP WITH YOUR PRIMARY DOCTOR IN 1-2 DAYS. RETURN TO THE ER FOR ANY WORSENING SYMPTOMS OR NEW CONCERNS.     Chest Pain    WHAT YOU NEED TO KNOW:    Chest pain can be caused by a range of conditions, from not serious to life-threatening. Chest pain can be a symptom of a digestive problem, such as acid reflux or a stomach ulcer. An anxiety attack or a strong emotion, such as anger, can also cause chest pain. Infection, inflammation, or a fracture in the bones or cartilage in your chest can cause pain or discomfort. Sometimes chest pain or pressure is caused by poor blood flow to your heart (angina). Chest pain may also be caused by life-threatening conditions such as a heart attack or blood clot in your lungs.     DISCHARGE INSTRUCTIONS:    Call 911 if:     You have any of the following signs of a heart attack:   Squeezing, pressure, or pain in your chest       and any of the following:   Discomfort or pain in your back, neck, jaw, stomach, or arm       Shortness of breath      Nausea or vomiting      Lightheadedness or a sudden cold sweat        Return to the emergency department if:     You have chest discomfort that gets worse, even with medicine.      You cough or vomit blood.       Your bowel movements are black or bloody.       You cannot stop vomiting, or it hurts to swallow.     Contact your healthcare provider if:     You have questions or concerns about your condition or care.        Medicines:     Medicines may be given to treat the cause of your chest pain. Examples include pain medicine, anxiety medicine, or medicines to increase blood flow to your heart.       Do not take certain medicines without asking your healthcare provider first. These include NSAIDs, herbal or vitamin supplements, or hormones (estrogen or progestin).       Take your medicine as directed. Contact your healthcare provider if you think your medicine is not helping or if you have side effects. Tell him or her if you are allergic to any medicine. Keep a list of the medicines, vitamins, and herbs you take. Include the amounts, and when and why you take them. Bring the list or the pill bottles to follow-up visits. Carry your medicine list with you in case of an emergency.    Follow up with your healthcare provider within 72 hours, or as directed: You may need to return for more tests to find the cause of your chest pain. You may be referred to a specialist, such as a cardiologist or gastroenterologist. Write down your questions so you remember to ask them during your visits.     Healthy living tips: The following are general healthy guidelines. If your chest pain is caused by a heart problem, your healthcare provider will give you specific guidelines to follow.    Do not smoke. Nicotine and other chemicals in cigarettes and cigars can cause lung and heart damage. Ask your healthcare provider for information if you currently smoke and need help to quit. E-cigarettes or smokeless tobacco still contain nicotine. Talk to your healthcare provider before you use these products.       Eat a variety of healthy, low-fat, low-salt foods. Healthy foods include fruits, vegetables, whole-grain breads, low-fat dairy products, beans, lean meats, and fish. Ask for more information about a heart healthy diet.      Drink plenty of water every day. Your body is made of mostly water. Water helps your body to control your temperature and blood pressure. Ask your healthcare provider how much water you should drink every day.      Ask about activity. Your healthcare provider will tell you which activities to limit or avoid. Ask when you can drive, return to work, and have sex. Ask about the best exercise plan for you.      Maintain a healthy weight. Ask your healthcare provider how much you should weigh. Ask him or her to help you create a weight loss plan if you are overweight.       Get the flu and pneumonia vaccines. All adults should get the influenza (flu) vaccine. Get it every year as soon as it becomes available. The pneumococcal vaccine is given to adults aged 65 years or older. The vaccine is given every 5 years to prevent pneumococcal disease, such as pneumonia.    If you have a stent:     Carry your stent card with you at all times.       Let all healthcare providers know that you have a stent.

## 2025-01-17 NOTE — ED STATDOCS - PATIENT PORTAL LINK FT
You can access the FollowMyHealth Patient Portal offered by [facility] by registering at the following website: [email or web address]. By joining [employer]’s FollowMyHealth portal, you will also be able to view your health information using other applications (apps) compatible with our system.

## 2025-01-17 NOTE — ED STATDOCS - PROGRESS NOTE DETAILS
signed Daja Naik PA-C Pt seen initially in intake by Dr Goldberg.   59M c/o left sided arm, neck, back, and chest pain today. Worsened when pt moves his head around. No trauma. No significant findings on labwork, EKG, or cxr. Likely MSK pain, will send rx for motrin and flexeril. Pt driving home. recommend f/u PMD. return precautions given. Pt feeling well, pt and family agree with DC and plan of care.

## 2025-01-17 NOTE — ED STATDOCS - CLINICAL SUMMARY MEDICAL DECISION MAKING FREE TEXT BOX
EKG was unremarkable on my interpretation.  Chest x-ray was unremarkable on my interpretation.  CBC was within normal limits, CMP within normal limits, troponin within normal limits.  Single troponin utilize given pain greater than 3 hours.  Have no concern for ACS, PE, aortic dissection, or any other emergency cause of his chest pain.  Patient was discharged home in good condition with atypical chest pain.  Recommend close outpatient follow-up with cardiology, PCP.  Strict return precaution given for any worsening.  Patient verbalized understanding agreed to plan.

## 2025-01-17 NOTE — ED ADULT NURSE NOTE - OBJECTIVE STATEMENT
Pt is 58 yo male c/o chest pain. Pt reports left arm pain that radiates up his arm. Pt has a;so had a headache x1 week. Denies sob. Pt ambulatory.

## 2025-01-17 NOTE — ED STATDOCS - DISCHARGE DATE
ADULT NUTRITION INITIAL ASSESSMENT    Pt is at moderate nutrition risk. Pt meets moderate malnutrition criteria.       CRITERIA FOR MALNUTRITION DIAGNOSIS:  Criteria for non-severe malnutrition diagnosis: acute illness/injury related to energy intake less Osteoporosis, OTHER DISEASES, and Right wrist pain (10/12/2017). ANTHROPOMETRICS:  HT: 160 cm (5' 3\")  WT: 67.5 kg (148 lb 14.4 oz)   BMI: Body mass index is 26.38 kg/m².   BMI CLASSIFICATION: 25-29.9 kg/m2 - overweight  IBW: 115 lbs        129% IBW  Us 22*  22* 31*  31*   CREATSERUM 8.62* 5.28*  5.28* 6.82*  6.82*   CA 7.7* 7.7*  7.7* 7.5*  7.5*   MG 2.3 2.0 2.1    137  137 134*  134*   K 3.3* 3.4*  3.4* 3.9  3.9   CL 96* 98  98 95*  95*   CO2 30.0 30.0  30.0 30.0  30.0   PHOS  --  1.8* 3.5   OSMOC 17-Jan-2025

## 2025-01-17 NOTE — ED ADULT TRIAGE NOTE - CHIEF COMPLAINT QUOTE
PT presents to er with complaints of radiating left sided chest pain down his left arm, onset today at noon, denies taking asa prior to arrival, denies cardiac history. PT presents to er with complaints of left sided chest pain radiating down his left arm  onset today at noon, denies taking asa prior to arrival, denies cardiac history.

## 2025-01-18 NOTE — ED POST DISCHARGE NOTE - RESULT SUMMARY
ED postdischarge radiology discrepancy, right apical pleural fibrotic changes with the right hilum elevation, underlying neoplasm not ruled out, possible sequelae of TB.  Spoke with patient, states he was treated 40 years ago for tuberculosis and has scarring of the right lung that is known to him and is followed by his primary care doctor, recommended outpatient CT for better visualization, states he has had many scans done in the past.  Understands our concerns